# Patient Record
Sex: FEMALE | Race: WHITE | NOT HISPANIC OR LATINO
[De-identification: names, ages, dates, MRNs, and addresses within clinical notes are randomized per-mention and may not be internally consistent; named-entity substitution may affect disease eponyms.]

---

## 2020-02-25 ENCOUNTER — RESULT REVIEW (OUTPATIENT)
Age: 70
End: 2020-02-25

## 2021-05-07 PROBLEM — Z00.00 ENCOUNTER FOR PREVENTIVE HEALTH EXAMINATION: Status: ACTIVE | Noted: 2021-05-07

## 2021-05-19 ENCOUNTER — NON-APPOINTMENT (OUTPATIENT)
Age: 71
End: 2021-05-19

## 2021-05-19 ENCOUNTER — APPOINTMENT (OUTPATIENT)
Dept: BREAST CENTER | Facility: CLINIC | Age: 71
End: 2021-05-19
Payer: MEDICARE

## 2021-05-19 VITALS — WEIGHT: 196 LBS | BODY MASS INDEX: 36.07 KG/M2 | HEIGHT: 62 IN

## 2021-05-19 DIAGNOSIS — Z78.9 OTHER SPECIFIED HEALTH STATUS: ICD-10-CM

## 2021-05-19 DIAGNOSIS — Z15.89 GENETIC SUSCEPTIBILITY TO OTHER DISEASE: ICD-10-CM

## 2021-05-19 DIAGNOSIS — Z86.718 PERSONAL HISTORY OF OTHER VENOUS THROMBOSIS AND EMBOLISM: ICD-10-CM

## 2021-05-19 DIAGNOSIS — Z12.31 ENCOUNTER FOR SCREENING MAMMOGRAM FOR MALIGNANT NEOPLASM OF BREAST: ICD-10-CM

## 2021-05-19 DIAGNOSIS — Z87.898 PERSONAL HISTORY OF OTHER SPECIFIED CONDITIONS: ICD-10-CM

## 2021-05-19 DIAGNOSIS — Z85.850 PERSONAL HISTORY OF MALIGNANT NEOPLASM OF THYROID: ICD-10-CM

## 2021-05-19 PROCEDURE — 99205 OFFICE O/P NEW HI 60 MIN: CPT

## 2021-05-19 RX ORDER — WARFARIN 4 MG/1
TABLET ORAL
Refills: 0 | Status: ACTIVE | COMMUNITY

## 2021-05-19 RX ORDER — PANTOPRAZOLE 40 MG/1
TABLET, DELAYED RELEASE ORAL
Refills: 0 | Status: ACTIVE | COMMUNITY

## 2021-05-19 RX ORDER — LEVOTHYROXINE SODIUM 137 UG/1
TABLET ORAL
Refills: 0 | Status: ACTIVE | COMMUNITY

## 2021-05-19 NOTE — PHYSICAL EXAM
[Normocephalic] : normocephalic [Atraumatic] : atraumatic [Supple] : supple [No Supraclavicular Adenopathy] : no supraclavicular adenopathy [Examined in the supine and seated position] : examined in the supine and seated position [Symmetrical] : symmetrical [No dominant masses] : no dominant masses in right breast  [No dominant masses] : no dominant masses left breast [No Nipple Retraction] : no left nipple retraction [No Nipple Discharge] : no left nipple discharge [No Axillary Lymphadenopathy] : no left axillary lymphadenopathy [No Edema] : no edema [No Rashes] : no rashes [No Ulceration] : no ulceration [de-identified] : skattered birth marks bilaterally

## 2021-05-19 NOTE — CONSULT LETTER
[Dear  ___] : Dear  [unfilled], [( Thank you for referring [unfilled] for consultation for _____ )] : Thank you for referring [unfilled] for consultation for [unfilled] [Please see my note below.] : Please see my note below. [Consult Closing:] : Thank you very much for allowing me to participate in the care of this patient.  If you have any questions, please do not hesitate to contact me. [Sincerely,] : Sincerely, [FreeTextEntry3] : Shireen Solis MS DO\par Breast Surgeon\par [facility] \[place] [address]\par  [DrCesar  ___] : Dr. GOTTLIEB

## 2021-05-19 NOTE — HISTORY OF PRESENT ILLNESS
[FreeTextEntry1] : This is a 71 year old female referred by Dr. Fermin for abnormal imaging. Her current imaging dated 4/21/2021 shows a left central area of distortion that is felt to be compression artifact and a right LIQ tiny smoothly circumscribed nodule. A 6 month bilateral diagnostic mammogram has been recommended. She presents today for 2nd opinion consultation. Denies any trauma, changes to herbs, diet or supplements. She is scheduled for her second dose next week. 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 \par She has not noticed a change in her breast or a breast lump.\par She has not noticed a change in her nipple or nipple area.\par She has not noticed a change in the skin of the breast.\par She is not experiencing nipple discharge.\par She is not experiencing breast pain.\par She has not noticed a lump or lymph node under the armpit. \par \par BREAST CANCER RISK FACTORS\par Menarche: 9\par Date of LMP: age late 30s\par Menopause: post\par Grav: 5   Para: 3\par Age at first live birth:  26\par Nursed: Y \par Hysterectomy: Y 1995\par Oophorectomy: Y 1995\par OCP: N \par HRT: Y premarin and provera 10 years\par Last pap/pelvic exam: 2020 WNL \par Related family history: mother Breast Cancer age late 70s\par Ashkenazi: N \par Mastery risk assessment: \par BRCA testing: N \par Bra size: 40C\par \par Last mammogram:   4/22/21 bilateral diagnostic                           Location:  [facility]\par Report reviewed.                                                                      Images reviewed on PACS\par Results: Birads 3\par Scattered fibroglandular densities\par Left central breast density probable compression artifact\par Right LIQ tiny smoothly circumcised hypodense nodule stable dating back to 2016.\par No abnormal finding on targeted ultrasound on same date\par \par \par Last ultrasound:     4/22/21/2021 Bilateral targeted          Location: [facility]\par Report reviewed.                                 Images reviewed on PACS\par Results: Birads 3\par No suspicious finding to correlate with mammographic results.\par Reported on screening imaging on 4/6/2021- Right retro mild fluid filled ductal ectasia corresponding to stable mammo findings.\par \par Last MRI: never                                            Location:\par Report reviewed.\par

## 2021-05-19 NOTE — ASSESSMENT
[FreeTextEntry1] : 70 yo female with abnormal imaging\par recommend 2nd opinion radiology review\par I will call her with recommendations asap\par \par reviewed her risk status, she is not high risk\par We reviewed risk reduction strategies including maintaining a BMI <25, limiting red meat intake and alcoholic beverages to 3 per week and exercise (150 min/ week low intensity or 75 min/week high intensity). And maintaining a normal vitamin D level.\par plan bilateral mg/sono 4/2022\par \par She knows to call or return sooner should any concerns or questions arise.\par

## 2021-11-29 ENCOUNTER — APPOINTMENT (OUTPATIENT)
Dept: BREAST CENTER | Facility: CLINIC | Age: 71
End: 2021-11-29
Payer: MEDICARE

## 2021-11-29 VITALS
HEART RATE: 57 BPM | DIASTOLIC BLOOD PRESSURE: 72 MMHG | WEIGHT: 189 LBS | HEIGHT: 62 IN | SYSTOLIC BLOOD PRESSURE: 146 MMHG | BODY MASS INDEX: 34.78 KG/M2

## 2021-11-29 DIAGNOSIS — R92.8 OTHER ABNORMAL AND INCONCLUSIVE FINDINGS ON DIAGNOSTIC IMAGING OF BREAST: ICD-10-CM

## 2021-11-29 DIAGNOSIS — Z78.9 OTHER SPECIFIED HEALTH STATUS: ICD-10-CM

## 2021-11-29 PROCEDURE — 99214 OFFICE O/P EST MOD 30 MIN: CPT

## 2021-11-29 RX ORDER — ACETAMINOPHEN AND CODEINE 300; 30 MG/1; MG/1
300-30 TABLET ORAL
Qty: 30 | Refills: 0 | Status: ACTIVE | COMMUNITY
Start: 2021-11-22

## 2021-11-29 RX ORDER — METOCLOPRAMIDE 10 MG/1
10 TABLET ORAL
Qty: 28 | Refills: 0 | Status: ACTIVE | COMMUNITY
Start: 2021-11-25

## 2021-11-29 RX ORDER — LORAZEPAM 1 MG/1
1 TABLET ORAL
Qty: 9 | Refills: 0 | Status: ACTIVE | COMMUNITY
Start: 2021-11-20

## 2021-11-29 RX ORDER — DICLOFENAC SODIUM 1% 10 MG/G
1 GEL TOPICAL
Qty: 100 | Refills: 0 | Status: ACTIVE | COMMUNITY
Start: 2021-09-14

## 2021-11-29 RX ORDER — LATANOPROST/PF 0.005 %
0.01 DROPS OPHTHALMIC (EYE)
Qty: 8 | Refills: 0 | Status: ACTIVE | COMMUNITY
Start: 2021-03-31

## 2021-11-29 RX ORDER — LOSARTAN POTASSIUM 25 MG/1
25 TABLET, FILM COATED ORAL
Qty: 30 | Refills: 0 | Status: ACTIVE | COMMUNITY
Start: 2021-11-24

## 2021-11-29 NOTE — ASSESSMENT
[FreeTextEntry1] : 70 yo female with abnormal imaging\par \par she will get imaging asap. plan mg/sono and follow up u/s\par \par reviewed her risk status, she is not high risk\par We reviewed risk reduction strategies including maintaining a BMI <25, limiting red meat intake and alcoholic beverages to 3 per week and exercise (150 min/ week low intensity or 75 min/week high intensity). And maintaining a normal vitamin D level.\par \par f/u 6 months\par She knows to call or return sooner should any concerns or questions arise.\par

## 2021-11-29 NOTE — PHYSICAL EXAM
[Normocephalic] : normocephalic [Atraumatic] : atraumatic [Supple] : supple [No Supraclavicular Adenopathy] : no supraclavicular adenopathy [Examined in the supine and seated position] : examined in the supine and seated position [Symmetrical] : symmetrical [No dominant masses] : no dominant masses in right breast  [No dominant masses] : no dominant masses left breast [No Nipple Retraction] : no left nipple retraction [No Nipple Discharge] : no left nipple discharge [No Axillary Lymphadenopathy] : no left axillary lymphadenopathy [No Edema] : no edema [No Rashes] : no rashes [No Ulceration] : no ulceration [de-identified] : limited ROM left arm secondary to broken shoulder [de-identified] : skattered birth marks bilaterally

## 2021-11-29 NOTE — CONSULT LETTER
[Dear  ___] : Dear  [unfilled], [( Thank you for referring [unfilled] for consultation for _____ )] : Thank you for referring [unfilled] for consultation for [unfilled] [Please see my note below.] : Please see my note below. [Consult Closing:] : Thank you very much for allowing me to participate in the care of this patient.  If you have any questions, please do not hesitate to contact me. [Sincerely,] : Sincerely, [DrCesar  ___] : Dr. GOTTLIEB [FreeTextEntry3] : Shireen Solis MS DO\par Breast Surgeon\par [facility] \[place] [address]\par

## 2021-11-29 NOTE — HISTORY OF PRESENT ILLNESS
[FreeTextEntry1] : This is a 71 year old female referred by Dr. Fermin for abnormal imaging. Her current imaging dated 4/21/2021 shows a left central area of distortion that is felt to be compression artifact and a right LIQ tiny smoothly circumscribed nodule. A 6 month bilateral diagnostic mammogram has been recommended. She presents today for 2nd opinion consultation. Denies any trauma, changes to herbs, diet or supplements. She is scheduled for her second dose next week. 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 She broke left shoulder and hit left breast. She is rescheduled for imaging in 1 month. \par \par She does SBE. \par She has not noticed a change in her breast or a breast lump.\par She has not noticed a change in her nipple or nipple area.\par She has not noticed a change in the skin of the breast.\par She is not experiencing nipple discharge.\par She is not experiencing breast pain.\par She has not noticed a lump or lymph node under the armpit. \par \par BREAST CANCER RISK FACTORS\par Menarche: 9\par Date of LMP: age late 30s\par Menopause: post\par Grav: 5   Para: 3\par Age at first live birth:  26\par Nursed: Y \par Hysterectomy: Y 1995\par Oophorectomy: Y 1995\par OCP: N \par HRT: Y premarin and provera 10 years\par Last pap/pelvic exam: 2020 WNL \par Related family history: mother Breast Cancer age late 70s\par Ashkenazi: N \par Mastery risk assessment: \par BRCA testing: N \par Bra size: 40C\par \par Last mammogram:  5/26/2021 bilat                             Location:  [place]\par Report reviewed.                                                        Images reviewed on PACS\par Results: Birads 3\par a 3 to 4 mm circumscribed mass is present at the inner aspect of the right breast at approximately 3:00 that is grossly unchanged dating back to 2016. Asymmetry at the outer aspect of the left breast likely represents benign fibroglandular tissue and appears grossly unchanged back to 2016.\par \par Last ultrasound:     4/22/21/2021 Bilateral targeted          Location: [facility]\par Report reviewed.                                 Images reviewed on PACS\par Results: Birads 3\par No suspicious finding to correlate with mammographic results.\par Reported on screening imaging on 4/6/2021- Right retro mild fluid filled ductal ectasia corresponding to stable mammo findings.\par \par Last MRI: never                                            Location:\par Report reviewed.\par

## 2022-02-07 ENCOUNTER — APPOINTMENT (OUTPATIENT)
Dept: NEUROSURGERY | Facility: CLINIC | Age: 72
End: 2022-02-07

## 2022-02-22 ENCOUNTER — APPOINTMENT (OUTPATIENT)
Dept: NEUROSURGERY | Facility: CLINIC | Age: 72
End: 2022-02-22
Payer: MEDICARE

## 2022-02-22 PROCEDURE — 99205 OFFICE O/P NEW HI 60 MIN: CPT

## 2022-02-22 NOTE — HISTORY OF PRESENT ILLNESS
[de-identified] : Ms Cabrales presents in neurosurgical consultation at the request of Dr. Kacie Chisholm with her daughter in attendance. She has a PMHx of pulmonary embolism, thyroid disorder, eustachian tube dysfunction, and Methylenetetrahydrofolate reductase deficiency. For over a year she has been experiencing balance difficulties with gait disturbance which she has attributed to a decrease in exercise. Patient states that in November she was visiting her  in a nursing home when her foot 'got stuck' and she fell to the ground. She lost consciousness and struck her head at that time. She was evaluated in the emergency department at [facility] and was found to have a humerus fracture. Patient does not recall additional imaging being performed. She followed up with her primary care physician Dr. Ismael Fermin and reported headaches associated with double vision,  lightheadedness, and decreased concentration. CT head demonstrated findings consistent with a meningioma. We currently do not have these images. Patient states that since her fall, symptoms gradually improved. However, symptoms then began to recur after she contracted COVID this past December. Symptom management was followed by Dr. Chisholm, who suggested the diagnosis of concussion and post concussive syndrome with vestibular dysfunction. MRI brain 1/14/22 obtained, detailed below. \par \par Today the patient she feels relatively improved with improvement in gait instability, headaches, and visual disturbances. She was recently diagnosed with eustachian tube dysfunction and have been receiving vestibular therapy at the local concussion clinic. Recent EMG was normal. Denies other neurological symptoms.

## 2022-02-22 NOTE — DATA REVIEWED
[de-identified] : Noncontrast CT HEAD : 11/24/21: IMPRESSION: No acute intracranial hemorrhage.\par \par Nonspecific periatrial white matter calcification on the right.\par \par 9 x 11 x 7 mm partially calcified mass in the right frontal vertex adjacent to the falx likely a\par  meningioma. There is questionable edema associated with this in the adjacent right frontal lobe\par  versus beam hardening artifact.\par \par A follow-up elective MRI of the brain without and with contrast is  recommended for further\par  evaluation..\par  [de-identified] : MRI BRAIN WITH AND WITHOUT CONTRAST : 1/14/22: IMPRESSION: 2 intracranial meningiomas. The larger measures 0.9 x 1.3 x 1.0 cm. \par It causes mild indentation adjacent brain and there is mild vasogenic edema. \par Suggest neurology consultation and interval follow-up MRI at a clinically \par appropriate interval; perhaps initial follow-up study 3-4 months. \par   Mild sinus mucosal thickening. Moderate bilateral mastoid air cell mucosal \par thickening/mastoid fluid right greater than left. Correlate clinically. \par \par \par \par \par \par \par \par

## 2022-02-22 NOTE — PHYSICAL EXAM
[General Appearance - Alert] : alert [General Appearance - In No Acute Distress] : in no acute distress [General Appearance - Well Nourished] : well nourished [Oriented To Time, Place, And Person] : oriented to person, place, and time [Impaired Insight] : insight and judgment were intact [Affect] : the affect was normal [Cranial Nerves Optic (II)] : visual acuity intact bilaterally,  pupils equal round and reactive to light [Cranial Nerves Oculomotor (III)] : extraocular motion intact [Cranial Nerves Trigeminal (V)] : facial sensation intact symmetrically [Cranial Nerves Facial (VII)] : face symmetrical [Cranial Nerves Vestibulocochlear (VIII)] : hearing was intact bilaterally [Cranial Nerves Glossopharyngeal (IX)] : tongue and palate midline [Cranial Nerves Accessory (XI - Cranial And Spinal)] : head turning and shoulder shrug symmetric [Cranial Nerves Hypoglossal (XII)] : there was no tongue deviation with protrusion [Motor Strength] : muscle strength was normal in all four extremities [Motor Tone] : muscle tone was normal in all four extremities [Sensation Tactile Decrease] : light touch was intact [Abnormal Walk] : normal gait [Balance] : balance was intact

## 2022-02-22 NOTE — ASSESSMENT
[FreeTextEntry1] : Ms. Cabrales presents status post concussion with MRI brain with and without gadolinium 1/14/2022 reviewed independently by me demonstrating findings most consistent with 2 intracranial meningiomas. There is a small region of extra-axial enhancement along the lateral right frontal convexity, and a larger homogeneously enhancing right medial, posterior frontal/anterior parietal parafalcine mass lesion measuring approximately 0.9x1.3x1.0cm with a small amount of subjacent vasogenic edema. These lesions are unlikely to be etiologic for the patient's symptomatic presentation.\par \par Natural history and alternative management strategies discussed. I have recommended a short term follow up MRI brain with and without gadolinium at a 3 month time point, which will be in April 2022, with an appointment to follow. Neurosurgical intervention is not recommended at this time.\par \par I have asked he patient and her daughter to contact me for any symptomatic development or progression in the interim at which time we can obtain expedited follow up imaging. \par \par A total of 60 minutes were spent relative to this encounter.\par

## 2022-03-11 DIAGNOSIS — D32.9 BENIGN NEOPLASM OF MENINGES, UNSPECIFIED: ICD-10-CM

## 2022-04-18 NOTE — PHYSICAL EXAM
[General Appearance - Alert] : alert [General Appearance - In No Acute Distress] : in no acute distress [General Appearance - Well Nourished] : well nourished [Oriented To Time, Place, And Person] : oriented to person, place, and time [Impaired Insight] : insight and judgment were intact [Affect] : the affect was normal

## 2022-04-19 ENCOUNTER — APPOINTMENT (OUTPATIENT)
Dept: NEUROSURGERY | Facility: CLINIC | Age: 72
End: 2022-04-19
Payer: MEDICARE

## 2022-04-19 PROCEDURE — 99215 OFFICE O/P EST HI 40 MIN: CPT | Mod: 95

## 2022-04-19 NOTE — HISTORY OF PRESENT ILLNESS
[FreeTextEntry1] : Ms. Cabrales has agreed to two-way audiovisual telehealth consultation. She is located at her home [address] [place] [address] and I am located at my office at [facility].\par \par \par Ms. Cabrales presents in neurosurgical follow up. Previous notes and interval history reviewed. MRI brain from 4/5/22, detailed below. Denies new neurological symptoms. Recent diagnosis of rhematoid arthritis, on prednisone.

## 2022-04-19 NOTE — DATA REVIEWED
[de-identified] : MRI WITH AND WITHOUT CONTRAST: 4/5/22: MRI BRAIN IMPRESSION: \par   \par Stable meningiomas. No significant mass effect. \par   \par Interval decrease in right maxillary sinus mucosal thickening since the prior \par study. \par

## 2022-04-19 NOTE — ASSESSMENT
[FreeTextEntry1] : MRI brain 4/5/22 reviewed independently by me demonstrates findings most consistent with no change in size, morphology, or signal characteristics of small right convexity and right parafalcine meningiomas. A small amount of subjacent vasogenic edema relative to the parafalcine lesion is also unchanged. I have recommended surveillance MRI brain with and without contrast in 6 months with an appointment to follow. \par \par I have asked the patient to contact me for any symptomatic development or progression in the interim at which time we can obtain expedited follow up imaging.\par \par A total of 45 minutes were spent relative to this encounter.\par \par \par \par

## 2022-06-20 ENCOUNTER — APPOINTMENT (OUTPATIENT)
Dept: BREAST CENTER | Facility: CLINIC | Age: 72
End: 2022-06-20
Payer: MEDICARE

## 2022-06-20 VITALS
WEIGHT: 197 LBS | BODY MASS INDEX: 36.25 KG/M2 | DIASTOLIC BLOOD PRESSURE: 74 MMHG | HEIGHT: 62 IN | HEART RATE: 59 BPM | SYSTOLIC BLOOD PRESSURE: 149 MMHG

## 2022-06-20 PROCEDURE — 99214 OFFICE O/P EST MOD 30 MIN: CPT

## 2022-06-20 NOTE — ASSESSMENT
[FreeTextEntry1] : 89 yo female with FCD\par Plan for BL mammo/sono June 2023\par reviewed her risk status, she is not high risk\par We reviewed risk reduction strategies including maintaining a BMI <25, limiting red meat intake and alcoholic beverages to 3 per week and exercise (150 min/ week low intensity or 75 min/week high intensity). And maintaining a normal vitamin D level.\par \par f/u 1 year\par She knows to call or return sooner should any concerns or questions arise.\par

## 2022-06-20 NOTE — PHYSICAL EXAM
[Normocephalic] : normocephalic [Atraumatic] : atraumatic [Supple] : supple [No Supraclavicular Adenopathy] : no supraclavicular adenopathy [Examined in the supine and seated position] : examined in the supine and seated position [Symmetrical] : symmetrical [No dominant masses] : no dominant masses in right breast  [No dominant masses] : no dominant masses left breast [No Nipple Retraction] : no left nipple retraction [No Nipple Discharge] : no left nipple discharge [No Axillary Lymphadenopathy] : no left axillary lymphadenopathy [No Edema] : no edema [No Rashes] : no rashes [No Ulceration] : no ulceration [de-identified] : limited ROM left arm secondary to broken shoulder [de-identified] : skattered birth marks bilaterally

## 2022-06-20 NOTE — HISTORY OF PRESENT ILLNESS
[FreeTextEntry1] : This is a 72 year old female referred by Dr. Fermin for abnormal imaging. Her current imaging dated 4/21/2021 shows a left central area of distortion that is felt to be compression artifact and a right LIQ tiny smoothly circumscribed nodule. A 6 month bilateral diagnostic mammogram has been recommended. \par \par Patient's  is still in the nursing home, and she visits him several times per week. Patient denies any new personal medical history. Patient denies any breast complaints today.  \par \par She does SBE. \par She has not noticed a change in her breast or a breast lump.\par She has not noticed a change in her nipple or nipple area.\par She has not noticed a change in the skin of the breast.\par She is not experiencing nipple discharge.\par She is not experiencing breast pain.\par She has not noticed a lump or lymph node under the armpit. \par \par BREAST CANCER RISK FACTORS\par Menarche: 9\par Date of LMP: age late 30s\par Menopause: post\par Grav: 5   Para: 3\par Age at first live birth:  26\par Nursed: Y \par Hysterectomy: Y 1995\par Oophorectomy: Y 1995\par OCP: N \par HRT: Y premarin and provera 10 years\par Last pap/pelvic exam: 2020 WNL \par Related family history: mother Breast Cancer age late 70s\par Ashkenazi: N \par Mastery risk assessment: \par BRCA testing: N \par Bra size: 40C\par \par Last mammogram:  6/15/2022                            Location:  [place]\par Report reviewed.                                              Images reviewed on PACS\par Results: Birads 3\par a 3 to 4 mm circumscribed mass is present at the inner aspect of the right breast at approximately 3:00 that is grossly unchanged dating back to 2016. Asymmetry at the outer aspect of the left breast likely represents benign fibroglandular tissue and appears grossly unchanged back to 2016.\par \par Last ultrasound:6/15/2022             Location: [place]\par Report reviewed.                           Images reviewed on PACS\par Results: Birads 3\par No suspicious finding to correlate with mammographic results.\par Reported on screening imaging on 4/6/2021- Right retro mild fluid filled ductal ectasia corresponding to stable mammo findings.\par \par Last MRI: never                                            Location:\par Report reviewed.\par

## 2022-06-20 NOTE — CONSULT LETTER
[Dear  ___] : Dear  [unfilled], [( Thank you for referring [unfilled] for consultation for _____ )] : Thank you for referring [unfilled] for consultation for [unfilled] [Please see my note below.] : Please see my note below. [Consult Closing:] : Thank you very much for allowing me to participate in the care of this patient.  If you have any questions, please do not hesitate to contact me. [Sincerely,] : Sincerely, [DrCesar  ___] : Dr. GOTTLIEB [FreeTextEntry3] : Shireen Solis MS DO\par Breast Surgeon\par [facility] \[place] [address]\par

## 2022-10-02 ENCOUNTER — RESULT REVIEW (OUTPATIENT)
Age: 72
End: 2022-10-02

## 2022-10-04 ENCOUNTER — TRANSCRIPTION ENCOUNTER (OUTPATIENT)
Age: 72
End: 2022-10-04

## 2022-10-04 ENCOUNTER — APPOINTMENT (OUTPATIENT)
Dept: NEUROSURGERY | Facility: CLINIC | Age: 72
End: 2022-10-04

## 2022-10-04 PROCEDURE — 99215 OFFICE O/P EST HI 40 MIN: CPT

## 2022-10-04 NOTE — PHYSICAL EXAM
[General Appearance - Alert] : alert [General Appearance - In No Acute Distress] : in no acute distress [General Appearance - Well Nourished] : well nourished [Oriented To Time, Place, And Person] : oriented to person, place, and time [Impaired Insight] : insight and judgment were intact [Affect] : the affect was normal [Cranial Nerves Optic (II)] : visual acuity intact bilaterally,  pupils equal round and reactive to light [Cranial Nerves Oculomotor (III)] : extraocular motion intact [Cranial Nerves Trigeminal (V)] : facial sensation intact symmetrically [Cranial Nerves Facial (VII)] : face symmetrical [Cranial Nerves Vestibulocochlear (VIII)] : hearing was intact bilaterally [Cranial Nerves Glossopharyngeal (IX)] : tongue and palate midline [Cranial Nerves Accessory (XI - Cranial And Spinal)] : head turning and shoulder shrug symmetric [Cranial Nerves Hypoglossal (XII)] : there was no tongue deviation with protrusion [Sensation Tactile Decrease] : light touch was intact [FreeTextEntry6] : Bilateral plantar flexion and dorsiflexion limited due lower extremity edema as well as bilateral knee extension and flexion 4/5 [FreeTextEntry8] : Unsteady antalgic gait.

## 2022-10-04 NOTE — ASSESSMENT
[FreeTextEntry1] : 71 yo female presents with surveillance MRI brain with and without gadolinium 10/3/2022 reviewed independently by me which demonstrates no change in size, morphology, or signal characteristics of right lateral frontal convexity, and right medial, posterior frontal/anterior parietal parafalcine meningiomas. A small amount of subjacent vasogenic edema relative to the parafalcine lesion is also unchanged.\par \par Neurosurgical intervention is not recommended at this time. I have recommended surveillance MRI brain with and without gadolinium in one year with an appointment to follow. \par \par I have asked he patient and her daughter to contact me for any symptomatic development or progression in the interim at which time we can obtain expedited follow up imaging. \par \par A total of 45 minutes were spent relative to this encounter.\par  \par

## 2022-10-04 NOTE — DATA REVIEWED
[de-identified] : MRI BRAIN WITH AND WITHOUT CONTRAST: 10/2/22: IMPRESSION- Reviewed independently by me.

## 2022-10-04 NOTE — HISTORY OF PRESENT ILLNESS
[FreeTextEntry1] : Ms. Cabrales presents in neurosurgical follow up with her daughter in attendance. Previous notes and interval history reviewed. MRI brain 10/3/22 detailed below. Physical examination limited due to history of right knee osteoarthritis and bilateral lower extremity + 2 pitting edema. Noted to have antalgic gait. Denies new neurological symptoms.

## 2023-07-05 ENCOUNTER — RESULT REVIEW (OUTPATIENT)
Age: 73
End: 2023-07-05

## 2023-07-07 ENCOUNTER — APPOINTMENT (OUTPATIENT)
Dept: BREAST CENTER | Facility: CLINIC | Age: 73
End: 2023-07-07

## 2023-07-10 ENCOUNTER — APPOINTMENT (OUTPATIENT)
Dept: BREAST CENTER | Facility: CLINIC | Age: 73
End: 2023-07-10
Payer: MEDICARE

## 2023-07-10 VITALS
BODY MASS INDEX: 35.88 KG/M2 | SYSTOLIC BLOOD PRESSURE: 135 MMHG | WEIGHT: 195 LBS | HEIGHT: 62 IN | OXYGEN SATURATION: 97 % | DIASTOLIC BLOOD PRESSURE: 72 MMHG | HEART RATE: 64 BPM

## 2023-07-10 DIAGNOSIS — N60.12 DIFFUSE CYSTIC MASTOPATHY OF LEFT BREAST: ICD-10-CM

## 2023-07-10 DIAGNOSIS — Z80.3 FAMILY HISTORY OF MALIGNANT NEOPLASM OF BREAST: ICD-10-CM

## 2023-07-10 DIAGNOSIS — Z12.31 ENCOUNTER FOR SCREENING MAMMOGRAM FOR MALIGNANT NEOPLASM OF BREAST: ICD-10-CM

## 2023-07-10 DIAGNOSIS — N60.11 DIFFUSE CYSTIC MASTOPATHY OF LEFT BREAST: ICD-10-CM

## 2023-07-10 DIAGNOSIS — Z80.0 FAMILY HISTORY OF MALIGNANT NEOPLASM OF DIGESTIVE ORGANS: ICD-10-CM

## 2023-07-10 PROCEDURE — 99214 OFFICE O/P EST MOD 30 MIN: CPT

## 2023-07-10 NOTE — CONSULT LETTER
[Courtesy Letter:] : I had the pleasure of seeing your patient, [unfilled], in my office today. [DrCesar ___] : Dr. GOTTLIEB [FreeTextEntry3] : Shireen Solis MS DO\par Breast Surgeon\par [facility] \[place] [address]\par

## 2023-07-10 NOTE — ASSESSMENT
[FreeTextEntry1] : 74 yo female with FCD\par Plan for BL mammo July 2024\par discussed that she does not have dense breast so US can be omitted\par reviewed her risk status, she is not high risk\par We reviewed risk reduction strategies including maintaining a BMI <25, limiting red meat intake and alcoholic beverages to 3 per week and exercise (150 min/ week low intensity or 75 min/week high intensity). And maintaining a normal vitamin D level.\par emotional support provided\par f/u 1 year\par She knows to call or return sooner should any concerns or questions arise.\par

## 2023-07-10 NOTE — HISTORY OF PRESENT ILLNESS
[FreeTextEntry1] : This is a 73 year old female referred by Dr. Fermin for abnormal imaging. Her current imaging dated 4/21/2021 shows a left central area of distortion that is felt to be compression artifact and a right LIQ tiny smoothly circumscribed nodule. A 6 month bilateral diagnostic mammogram has been recommended. \par \par Patient's  is still in the nursing home, and she visits him several times per week. Patient denies any new personal medical history. Patient denies any breast complaints today.  She developed afib (2/2023) since having Covid (12/2022). She also c/o knee arthritis. She has a sister with uterine/ovarian cancer who now has kidney failure.\par \par She does SBE. \par She has not noticed a change in her breast or a breast lump.\par She has not noticed a change in her nipple or nipple area.\par She has not noticed a change in the skin of the breast.\par She is not experiencing nipple discharge.\par She is not experiencing breast pain.\par She has not noticed a lump or lymph node under the armpit. \par \par BREAST CANCER RISK FACTORS\par Menarche: 9\par Date of LMP: age late 30s\par Menopause: post\par Grav: 5   Para: 3\par Age at first live birth:  26\par Nursed: Y \par Hysterectomy: Y 1995\par Oophorectomy: Y 1995\par OCP: N \par HRT: Y premarin and provera 10 years\par Last pap/pelvic exam: 2020 WNL \par Related family history: mother Breast Cancer age late 70s\par Ashkenazi: N \par Mastery risk assessment: BRCAPRO 5.5% Tcv7 7.3%, TCv87.1%, Lawanda 9.7%, Dany 2.0%\par BRCA testing: N \par Bra size: 40C\par \par Last mammogram:  7/6/2023                            Location:  [place]\par Report reviewed.                                              Images reviewed on PACS\par Results: Birads 2\par Scattered fibroglandular densities\par No evidence of malignancy\par \par \par Last ultrasound: 7/6/2023             Location: [place]\par Report reviewed.                           Images reviewed on PACS\par Results: Birads 2\par No evidence of malignancy\par \par Last MRI: never                                            Location:\par Report reviewed.\par

## 2023-11-02 ENCOUNTER — RESULT REVIEW (OUTPATIENT)
Age: 73
End: 2023-11-02

## 2023-11-28 ENCOUNTER — APPOINTMENT (OUTPATIENT)
Dept: NEUROSURGERY | Facility: CLINIC | Age: 73
End: 2023-11-28
Payer: MEDICARE

## 2023-11-28 PROCEDURE — 99215 OFFICE O/P EST HI 40 MIN: CPT

## 2024-07-09 ENCOUNTER — RESULT REVIEW (OUTPATIENT)
Age: 74
End: 2024-07-09

## 2024-07-22 ENCOUNTER — APPOINTMENT (OUTPATIENT)
Dept: BREAST CENTER | Facility: CLINIC | Age: 74
End: 2024-07-22
Payer: MEDICARE

## 2024-07-22 VITALS
SYSTOLIC BLOOD PRESSURE: 144 MMHG | HEART RATE: 58 BPM | WEIGHT: 187 LBS | DIASTOLIC BLOOD PRESSURE: 71 MMHG | HEIGHT: 62 IN | BODY MASS INDEX: 34.41 KG/M2

## 2024-07-22 DIAGNOSIS — R92.30 INCONCLUSIVE MAMMOGRAM: ICD-10-CM

## 2024-07-22 DIAGNOSIS — Z12.31 ENCOUNTER FOR SCREENING MAMMOGRAM FOR MALIGNANT NEOPLASM OF BREAST: ICD-10-CM

## 2024-07-22 DIAGNOSIS — N60.11 DIFFUSE CYSTIC MASTOPATHY OF LEFT BREAST: ICD-10-CM

## 2024-07-22 DIAGNOSIS — N60.12 DIFFUSE CYSTIC MASTOPATHY OF LEFT BREAST: ICD-10-CM

## 2024-07-22 DIAGNOSIS — R92.2 INCONCLUSIVE MAMMOGRAM: ICD-10-CM

## 2024-07-22 PROCEDURE — 99214 OFFICE O/P EST MOD 30 MIN: CPT

## 2024-07-22 NOTE — HISTORY OF PRESENT ILLNESS
[FreeTextEntry1] : This is a 74 year old female referred by Dr. Fermin for abnormal imaging. Her current imaging dated 4/21/2021 shows a left central area of distortion that is felt to be compression artifact and a right LIQ tiny smoothly circumscribed nodule  Patient denies any new personal medical history. Patient denies any breast complaints today.  She developed afib (2/2023) since having Covid (12/2022). She also c/o knee arthritis. She had a sister with uterine/ovarian cancer who passed 9/2023 her  passed 12/1/2023. Then she had an issue with social security mix up and has been dealing with this.  She does SBE.  She has not noticed a change in her breast or a breast lump. She has not noticed a change in her nipple or nipple area. She has not noticed a change in the skin of the breast. She is not experiencing nipple discharge. She is not experiencing breast pain. She has not noticed a lump or lymph node under the armpit.   BREAST CANCER RISK FACTORS Menarche: 9 Date of LMP: age late 30s Menopause: post Grav: 5   Para: 3 Age at first live birth:  26 Nursed: Y  Hysterectomy: Y 1995 Oophorectomy: Y 1995 OCP: N  HRT: Y premarin and provera 10 years Last pap/pelvic exam: 2020 WNL  Related family history: mother Breast Cancer age late 70s Ashkenazi: N  Mastery risk assessment: BRCAPRO 5.5% Tcv7 7.3%, TCv87.1%, Lawanda 9.7%, Dany 2.0% BRCA testing: N  Bra size: 40C  Last mammogram:  7/10/2024                            Location:  [place] Report reviewed.                                              Images reviewed on PACS Results: Birads 2 Scattered fibroglandular densities No evidence of malignancy   Last ultrasound: 7/10/2024             Location: [place] Report reviewed.                           Images reviewed on PACS Results: Birads 2 No evidence of malignancy  Last MRI: never                                            Location: Report reviewed.

## 2024-07-22 NOTE — CONSULT LETTER
[Dear  ___] : Dear  [unfilled], [Courtesy Letter:] : I had the pleasure of seeing your patient, [unfilled], in my office today. [Please see my note below.] : Please see my note below. [Consult Closing:] : Thank you very much for allowing me to participate in the care of this patient.  If you have any questions, please do not hesitate to contact me. [Sincerely,] : Sincerely, [DrCesar  ___] : Dr. GOTTLIEB [DrCesar ___] : Dr. GOTTLIEB [FreeTextEntry3] : Shireen Solis MS DO\par  Breast Surgeon\par  [facility] \[place] [address]\par

## 2024-07-22 NOTE — PHYSICAL EXAM
[Normocephalic] : normocephalic [Atraumatic] : atraumatic [Supple] : supple [No Supraclavicular Adenopathy] : no supraclavicular adenopathy [Examined in the supine and seated position] : examined in the supine and seated position [Symmetrical] : symmetrical [No dominant masses] : no dominant masses in right breast  [No dominant masses] : no dominant masses left breast [No Nipple Retraction] : no left nipple retraction [No Nipple Discharge] : no left nipple discharge [No Axillary Lymphadenopathy] : no left axillary lymphadenopathy [No Edema] : no edema [No Rashes] : no rashes [No Ulceration] : no ulceration [de-identified] : left nipple 12:00 freckle/mole [de-identified] : skattered birth marks bilaterally

## 2024-07-22 NOTE — ASSESSMENT
[FreeTextEntry1] : 75 yo female with FCD Plan for BL mammo July 2025 discussed that she does not have dense breast so US can be omitted reviewed her risk status, she is not high risk We reviewed risk reduction strategies including maintaining a BMI <25, limiting red meat intake and alcoholic beverages to 3 per week and exercise (150 min/ week low intensity or 75 min/week high intensity). And maintaining a normal vitamin D level. emotional support provided f/u 1 year She knows to call or return sooner should any concerns or questions arise.

## 2025-02-20 ENCOUNTER — APPOINTMENT (OUTPATIENT)
Dept: NEUROSURGERY | Facility: CLINIC | Age: 75
End: 2025-02-20
Payer: MEDICARE

## 2025-02-20 PROCEDURE — 99215 OFFICE O/P EST HI 40 MIN: CPT | Mod: 93

## 2025-02-20 PROCEDURE — G2211 COMPLEX E/M VISIT ADD ON: CPT | Mod: 93

## 2025-07-18 ENCOUNTER — APPOINTMENT (OUTPATIENT)
Dept: BREAST CENTER | Facility: CLINIC | Age: 75
End: 2025-07-18
Payer: MEDICARE

## 2025-07-18 VITALS
HEIGHT: 62 IN | WEIGHT: 184 LBS | OXYGEN SATURATION: 98 % | SYSTOLIC BLOOD PRESSURE: 129 MMHG | DIASTOLIC BLOOD PRESSURE: 68 MMHG | BODY MASS INDEX: 33.86 KG/M2 | HEART RATE: 67 BPM

## 2025-07-18 PROCEDURE — 99213 OFFICE O/P EST LOW 20 MIN: CPT

## 2025-07-30 ENCOUNTER — RESULT REVIEW (OUTPATIENT)
Age: 75
End: 2025-07-30

## 2025-08-01 ENCOUNTER — NON-APPOINTMENT (OUTPATIENT)
Age: 75
End: 2025-08-01

## 2025-08-01 DIAGNOSIS — Z71.89 OTHER SPECIFIED COUNSELING: ICD-10-CM

## 2025-08-04 PROBLEM — Z71.89 CARDIAC RISK COUNSELING: Status: ACTIVE | Noted: 2025-08-04

## 2025-08-19 ENCOUNTER — RESULT REVIEW (OUTPATIENT)
Age: 75
End: 2025-08-19